# Patient Record
(demographics unavailable — no encounter records)

---

## 2024-11-29 NOTE — HISTORY OF PRESENT ILLNESS
[de-identified] : Patient is a 66-year-old female here for evaluation of left hand/wrist pain.  Patient states about a week ago she began noticing pain to her wrist.  Patient states every day her pain has been worsening up to the point at activity is now 10 out of 10 amount of pain on the pain scale.  Patient states that she followed up with her primary physician, went for blood work to rule out gout.  Uric acid test came back normal.  Patient was told to follow-up with orthopedics.  Denies numbness or tingling  Left hand/wrist exam: Mild wrist swelling.  No ecchymosis, no erythema.  Significant tenderness palpation to volar aspect of distal radius, limited range of motion to wrist secondary to significant pain, good range of motion of hand, no gross instability neurovascular intact  X-ray left wrist: No acute fractures consultations, dislocations.  There is a large calcification to the volar aspect of the wrist consistent with possible calcific tendinitis  Ultrasound ordered for further evaluation of possible calcific tendinitis.  Medrol Dosepak sent to patient's pharmacy discussed side effects in detail.  Patient fitted for cock-up wrist brace to use as needed.  Patient will call office if symptoms worsen or change.  Patient will follow-up with hand department after ultrasound for further evaluation and treatment.  Patient understands agrees with plan.  Diagnosis consistent with calcific tendinitis versus bony island

## 2025-01-13 NOTE — ASSESSMENT
[FreeTextEntry1] :  The patient is being seen today for her left wrist. She had pain at the radial aspect of her left wrist. It is better than at She went to her primary care provider clinic who thought it might be gout.  She had lab work done which was negative.  She then had an appointment with AMY Hammond and was diagnosed with calcific tendintis and gave her Medrol Dosepak.  She is feeling a lot better than she was prior.  left wrist: No gross deformities visualized, tender palpation over volar radial aspect of the wrist, full range of motion of fingers and wrist, neurovasc intact  The patient was advised of the diagnosis of calcific tendinitis as evidenced by her x-rays that were done in the office at her last visit. The natural history of the pathology was explained in full to the patient in layman's terms.  The Medrol Dosepak helped her significantly.  I recommend taking a nsaid to reduce inflammation and pain.  If she finds that it returns or she continues to have significant pain she will return.  She will follow up as needed.